# Patient Record
Sex: MALE | Race: WHITE | NOT HISPANIC OR LATINO | Employment: FULL TIME | URBAN - METROPOLITAN AREA
[De-identification: names, ages, dates, MRNs, and addresses within clinical notes are randomized per-mention and may not be internally consistent; named-entity substitution may affect disease eponyms.]

---

## 2022-01-06 ENCOUNTER — OFFICE VISIT (OUTPATIENT)
Dept: URGENT CARE | Facility: CLINIC | Age: 22
End: 2022-01-06
Payer: COMMERCIAL

## 2022-01-06 VITALS
TEMPERATURE: 99.1 F | HEIGHT: 76 IN | BODY MASS INDEX: 35.31 KG/M2 | RESPIRATION RATE: 18 BRPM | HEART RATE: 90 BPM | WEIGHT: 290 LBS | DIASTOLIC BLOOD PRESSURE: 80 MMHG | SYSTOLIC BLOOD PRESSURE: 130 MMHG | OXYGEN SATURATION: 96 %

## 2022-01-06 DIAGNOSIS — L02.215 PERINEAL ABSCESS: ICD-10-CM

## 2022-01-06 PROCEDURE — 99204 OFFICE O/P NEW MOD 45 MIN: CPT | Performed by: PHYSICIAN ASSISTANT

## 2022-01-06 RX ORDER — SULFAMETHOXAZOLE AND TRIMETHOPRIM 800; 160 MG/1; MG/1
1 TABLET ORAL 2 TIMES DAILY
Qty: 20 TABLET | Refills: 0 | Status: SHIPPED | OUTPATIENT
Start: 2022-01-06 | End: 2022-01-16

## 2022-01-06 ASSESSMENT — ENCOUNTER SYMPTOMS
FLANK PAIN: 0
BLOOD IN STOOL: 0
CHILLS: 0
BACK PAIN: 0
VOMITING: 0
FEVER: 0
MYALGIAS: 0
ROS SKIN COMMENTS: DRAINING ABSCESS
NAUSEA: 0
ABDOMINAL PAIN: 0

## 2022-01-06 NOTE — PATIENT INSTRUCTIONS
Skin Abscess    A skin abscess is an infected area on or under your skin that contains a collection of pus and other material. An abscess may also be called a furuncle, carbuncle, or boil. An abscess can occur in or on almost any part of your body.  Some abscesses break open (rupture) on their own. Most continue to get worse unless they are treated. The infection can spread deeper into the body and eventually into your blood, which can make you feel ill. Treatment usually involves draining the abscess.  What are the causes?  An abscess occurs when germs, like bacteria, pass through your skin and cause an infection. This may be caused by:  · A scrape or cut on your skin.  · A puncture wound through your skin, including a needle injection or insect bite.  · Blocked oil or sweat glands.  · Blocked and infected hair follicles.  · A cyst that forms beneath your skin (sebaceous cyst) and becomes infected.  What increases the risk?  This condition is more likely to develop in people who:  · Have a weak body defense system (immune system).  · Have diabetes.  · Have dry and irritated skin.  · Get frequent injections or use illegal IV drugs.  · Have a foreign body in a wound, such as a splinter.  · Have problems with their lymph system or veins.  What are the signs or symptoms?  Symptoms of this condition include:  · A painful, firm bump under the skin.  · A bump with pus at the top. This may break through the skin and drain.  Other symptoms include:  · Redness surrounding the abscess site.  · Warmth.  · Swelling of the lymph nodes (glands) near the abscess.  · Tenderness.  · A sore on the skin.  How is this diagnosed?  This condition may be diagnosed based on:  · A physical exam.  · Your medical history.  · A sample of pus. This may be used to find out what is causing the infection.  · Blood tests.  · Imaging tests, such as an ultrasound, CT scan, or MRI.  How is this treated?  A small abscess that drains on its own may not  need treatment. Treatment for larger abscesses may include:  · Moist heat or heat pack applied to the area several times a day.  · A procedure to drain the abscess (incision and drainage).  · Antibiotic medicines. For a severe abscess, you may first get antibiotics through an IV and then change to antibiotics by mouth.  Follow these instructions at home:  Medicines    · Take over-the-counter and prescription medicines only as told by your health care provider.  · If you were prescribed an antibiotic medicine, take it as told by your health care provider. Do not stop taking the antibiotic even if you start to feel better.  Abscess care    · If you have an abscess that has not drained, apply heat to the affected area. Use the heat source that your health care provider recommends, such as a moist heat pack or a heating pad.  ? Place a towel between your skin and the heat source.  ? Leave the heat on for 20-30 minutes.  ? Remove the heat if your skin turns bright red. This is especially important if you are unable to feel pain, heat, or cold. You may have a greater risk of getting burned.  · Follow instructions from your health care provider about how to take care of your abscess. Make sure you:  ? Cover the abscess with a bandage (dressing).  ? Change your dressing or gauze as told by your health care provider.  ? Wash your hands with soap and water before you change the dressing or gauze. If soap and water are not available, use hand .  · Check your abscess every day for signs of a worsening infection. Check for:  ? More redness, swelling, or pain.  ? More fluid or blood.  ? Warmth.  ? More pus or a bad smell.  General instructions  · To avoid spreading the infection:  ? Do not share personal care items, towels, or hot tubs with others.  ? Avoid making skin contact with other people.  · Keep all follow-up visits as told by your health care provider. This is important.  Contact a health care provider if you  have:  · More redness, swelling, or pain around your abscess.  · More fluid or blood coming from your abscess.  · Warm skin around your abscess.  · More pus or a bad smell coming from your abscess.  · A fever.  · Muscle aches.  · Chills or a general ill feeling.  Get help right away if you:  · Have severe pain.  · See red streaks on your skin spreading away from the abscess.  Summary  · A skin abscess is an infected area on or under your skin that contains a collection of pus and other material.  · A small abscess that drains on its own may not need treatment.  · Treatment for larger abscesses may include having a procedure to drain the abscess and taking an antibiotic.  This information is not intended to replace advice given to you by your health care provider. Make sure you discuss any questions you have with your health care provider.  Document Released: 09/27/2006 Document Revised: 04/09/2020 Document Reviewed: 01/31/2019  [employer] Patient Education © 2020 [employer]

## 2022-01-06 NOTE — PROGRESS NOTES
"Subjective:   Chris Smith is a 21 y.o. male who presents for Rectal Pain (x 2 days ago, rectal bleeding pain, frecuent urination)      HPI  21 y.o. male presents to urgent care with new problem to provider of perineal abscess with associated worsening pain for the last few days with spontaneous bloody discharge and some improvement of pain noted yesterday.  Patient denies history of similar.  He denies fevers or vomiting.  No rectal bleeding.  Denies dysuria or hematuria.  Denies history of MRSA.  Denies other associated aggravating or alleviating factors.     Review of Systems   Constitutional: Negative for chills and fever.   Gastrointestinal: Negative for abdominal pain, blood in stool, melena, nausea and vomiting.   Genitourinary: Positive for frequency. Negative for dysuria, flank pain, hematuria and urgency.   Musculoskeletal: Negative for back pain and myalgias.   Skin:        Draining abscess       Patient Active Problem List   Diagnosis   • Seasonal allergies   • Obesity   • Viral URI   • Snoring     Past Surgical History:   Procedure Laterality Date   • ARCH BAR APPLICATION  2/25/2012    Performed by JOSE LUIS WEIR at SURGERY [facility]     Social History     Tobacco Use   • Smoking status: Current Every Day Smoker   • Smokeless tobacco: Never Used   Vaping Use   • Vaping Use: Every day   • Substances: Nicotine   • Devices: Refillable tank   Substance Use Topics   • Alcohol use: No   • Drug use: No      Family History   Problem Relation Age of Onset   • Cancer Father         lymphoma      (Allergies, Medications, & Tobacco/Substance Use were reconciled by the Medical Assistant and reviewed by myself. The family history is prepopulated)     Objective:     /80   Pulse 90   Temp 37.3 °C (99.1 °F)   Resp 18   Ht 1.93 m (6' 4\")   Wt (!) 132 kg (290 lb)   SpO2 96%   BMI 35.30 kg/m²     Physical Exam  Vitals reviewed. Exam conducted with a chaperone present.   Constitutional:       Appearance: " Normal appearance.   HENT:      Head: Normocephalic and atraumatic.   Eyes:      Conjunctiva/sclera: Conjunctivae normal.   Cardiovascular:      Rate and Rhythm: Normal rate.   Pulmonary:      Effort: Pulmonary effort is normal. No respiratory distress.   Abdominal:      Palpations: Abdomen is soft.      Tenderness: There is no abdominal tenderness.   Genitourinary:      Musculoskeletal:      Cervical back: Normal range of motion.   Neurological:      Mental Status: He is alert and oriented to person, place, and time.   Psychiatric:         Mood and Affect: Mood normal.         Behavior: Behavior normal.         Thought Content: Thought content normal.         Judgment: Judgment normal.         Assessment/Plan:     1. Perineal abscess  sulfamethoxazole-trimethoprim (BACTRIM DS) 800-160 MG tablet     Patient started on 10 day course of Bactrim. Recommend sitz baths and ibuprofen for symptomatic relief.   No indication for I&D at this time. Continue to monitor for worsening symptoms including worsening pain, increasing size of abscess, fevers.     Differential diagnosis, natural history, supportive care, and indications for immediate follow-up discussed.    Advised the patient to follow-up with the primary care physician for recheck, reevaluation, and consideration of further management.  Patient verbalized understanding of treatment plan and has no further questions regarding care.     I personally reviewed prior external notes and test results pertinent to today's visit.     Please note that this dictation was created using voice recognition software. I have made a reasonable attempt to correct obvious errors, but I expect that there are errors of grammar and possibly content that I did not discover before finalizing the note.    This note was electronically signed by Shania Ramirez PA-C

## 2023-01-18 ENCOUNTER — OFFICE VISIT (OUTPATIENT)
Dept: MEDICAL GROUP | Facility: MEDICAL CENTER | Age: 23
End: 2023-01-18
Payer: COMMERCIAL

## 2023-01-18 VITALS
WEIGHT: 262.35 LBS | SYSTOLIC BLOOD PRESSURE: 110 MMHG | HEIGHT: 76 IN | BODY MASS INDEX: 31.95 KG/M2 | DIASTOLIC BLOOD PRESSURE: 66 MMHG | HEART RATE: 88 BPM | RESPIRATION RATE: 16 BRPM | OXYGEN SATURATION: 96 % | TEMPERATURE: 98.8 F

## 2023-01-18 DIAGNOSIS — Z76.89 ENCOUNTER TO ESTABLISH CARE WITH NEW DOCTOR: ICD-10-CM

## 2023-01-18 DIAGNOSIS — Z00.00 PREVENTATIVE HEALTH CARE: ICD-10-CM

## 2023-01-18 DIAGNOSIS — Z11.59 NEED FOR HEPATITIS C SCREENING TEST: ICD-10-CM

## 2023-01-18 DIAGNOSIS — Z11.4 SCREENING FOR HIV (HUMAN IMMUNODEFICIENCY VIRUS): ICD-10-CM

## 2023-01-18 DIAGNOSIS — J30.2 SEASONAL ALLERGIES: ICD-10-CM

## 2023-01-18 DIAGNOSIS — Z88.7 HISTORY OF INFLUENZA VACCINE ALLERGY: ICD-10-CM

## 2023-01-18 PROCEDURE — 99214 OFFICE O/P EST MOD 30 MIN: CPT | Performed by: STUDENT IN AN ORGANIZED HEALTH CARE EDUCATION/TRAINING PROGRAM

## 2023-01-18 ASSESSMENT — PATIENT HEALTH QUESTIONNAIRE - PHQ9: CLINICAL INTERPRETATION OF PHQ2 SCORE: 0

## 2023-01-18 NOTE — PROGRESS NOTES
Subjective:     CC:  Diagnoses of History of influenza vaccine allergy, Seasonal allergies, BMI 31.0-31.9,adult, Preventative health care, Screening for HIV (human immunodeficiency virus), Need for hepatitis C screening test, and Encounter to establish care with new doctor were pertinent to this visit.    HISTORY OF THE PRESENT ILLNESS: Patient is a 22 y.o. male. This pleasant patient is here today to establish care and discuss chronic conditions. His prior PCP was none.    Problem   Preventative Health Care    Patient is here to establish care today. He will be starting a new job working in [employer] at [employer]. He is requesting medical exemption from flu vaccine. He reports previously got severe dehydration after getting flu vaccine in 2018.     History of Influenza Vaccine Allergy    He reports previously got severe dehydration after getting flu vaccine in 2018.     Bmi 31.0-31.9,Adult    Chronic condition. He eats fair diet in general. He does not regularly exercise.     Seasonal Allergies    Chronic condition.  Current regimen: Claritin as needed  No acute concerns.         No current Epic-ordered outpatient medications on file.     No current Epic-ordered facility-administered medications on file.     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  Alcohol/tobacco/illicit drugs: vaping daily, rare EtOH, denies illicit drugs    Health Maintenance: reviewed  Vaccines: due for flu (declined), Tdap, PCV, HPV, COVID-19    ROS:   Gen: no fevers/chills, no changes in weight  Eyes: no changes in vision  Pulm: no sob, no cough  CV: no chest pain, no palpitations  GI: no nausea/vomiting, no diarrhea  : no dysuria  MSk: no myalgias  Skin: no rash  Neuro: no headaches, no numbness/tingling      Objective:     Exam: /66 (BP Location: Left arm, Patient Position: Sitting, BP Cuff Size: Adult)   Pulse 88   Temp 37.1 °C (98.8 °F)  "(Temporal)   Resp 16   Ht 1.93 m (6' 4\")   Wt 119 kg (262 lb 5.6 oz)   SpO2 96%  Body mass index is 31.93 kg/m².    General: Normal appearing. No distress.  HEENT: Normocephalic. Mallampati class 4.  Neck: Supple without JVD or bruit. Thyroid is not enlarged.  Pulmonary: Clear to ausculation. Normal effort. No rales, ronchi, or wheezing.  Cardiovascular: Regular rate and rhythm without murmur. Carotid and radial pulses are intact and equal bilaterally.  Abdomen: Soft, nontender, nondistended. Normal bowel sounds.  Neurologic: Grossly nonfocal  Skin: Warm and dry.  No obvious lesions.  Musculoskeletal: Normal gait. No extremity cyanosis, clubbing, or edema.  Psych: Normal mood and affect. Alert and oriented x3. Judgment and insight is normal.    Labs: No recent lab results available for review at this time    Assessment & Plan:   22 y.o. male with the following -    1. History of influenza vaccine allergy  Reported history of severe dehydration after receiving flu vaccine in 2018. Patient acknowledged understanding of the benefits vs risks of influenza vaccine and prefers to avoid flu vaccine in the future.  - medical exemption form completed and returned to patient    2. Seasonal allergies  Chronic condition, stable.  - cont current regimen: Claritin as needed    3. BMI 31.0-31.9,adult  - Comp Metabolic Panel; Future  - HEMOGLOBIN A1C; Future  - Lipid Profile; Future  - TSH WITH REFLEX TO FT4; Future  - Patient identified as having weight management issue.  Appropriate orders and counseling given.    4. Preventative health care  - Comp Metabolic Panel; Future  - HEMOGLOBIN A1C; Future  - HEP C VIRUS ANTIBODY; Future  - Lipid Profile; Future  - TSH WITH REFLEX TO FT4; Future  - HIV AG/AB COMBO ASSAY SCREENING; Future    5. Screening for HIV (human immunodeficiency virus)  - HIV AG/AB COMBO ASSAY SCREENING; Future    6. Need for hepatitis C screening test  - HEP C VIRUS ANTIBODY; Future    7. Encounter to " establish care with new doctor        Return in about 2 months (around 3/18/2023) for annual physical.    Please note that this dictation was created using voice recognition software. I have made every reasonable attempt to correct obvious errors, but I expect that there are errors of grammar and possibly content that I did not discover before finalizing the note.

## 2023-09-08 ENCOUNTER — DOCUMENTATION (OUTPATIENT)
Dept: HEALTH INFORMATION MANAGEMENT | Facility: OTHER | Age: 23
End: 2023-09-08
Payer: COMMERCIAL

## 2023-10-01 ENCOUNTER — HOSPITAL ENCOUNTER (EMERGENCY)
Facility: MEDICAL CENTER | Age: 23
End: 2023-10-01
Attending: EMERGENCY MEDICINE
Payer: COMMERCIAL

## 2023-10-01 VITALS
HEART RATE: 80 BPM | OXYGEN SATURATION: 98 % | SYSTOLIC BLOOD PRESSURE: 135 MMHG | DIASTOLIC BLOOD PRESSURE: 84 MMHG | HEIGHT: 76 IN | RESPIRATION RATE: 16 BRPM | WEIGHT: 278.88 LBS | BODY MASS INDEX: 33.96 KG/M2 | TEMPERATURE: 98.1 F

## 2023-10-01 DIAGNOSIS — H16.9 KERATITIS, RIGHT: ICD-10-CM
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
## 2023-10-01 DIAGNOSIS — H16.9 KERATITIS, LEFT: ICD-10-CM

## 2023-10-01 PROCEDURE — 700101 HCHG RX REV CODE 250: Performed by: EMERGENCY MEDICINE

## 2023-10-01 PROCEDURE — A9270 NON-COVERED ITEM OR SERVICE: HCPCS | Performed by: EMERGENCY MEDICINE

## 2023-10-01 PROCEDURE — 99283 EMERGENCY DEPT VISIT LOW MDM: CPT

## 2023-10-01 PROCEDURE — 700102 HCHG RX REV CODE 250 W/ 637 OVERRIDE(OP): Performed by: EMERGENCY MEDICINE

## 2023-10-01 RX ORDER — ERYTHROMYCIN 5 MG/G
1 OINTMENT OPHTHALMIC 4 TIMES DAILY
Qty: 3.5 G | Refills: 0 | Status: ACTIVE | OUTPATIENT
Start: 2023-10-01

## 2023-10-01 RX ORDER — PROPARACAINE HYDROCHLORIDE 5 MG/ML
1 SOLUTION/ DROPS OPHTHALMIC ONCE
Status: COMPLETED | OUTPATIENT
Start: 2023-10-01 | End: 2023-10-01

## 2023-10-01 RX ORDER — HYDROCODONE BITARTRATE AND ACETAMINOPHEN 5; 325 MG/1; MG/1
1 TABLET ORAL EVERY 4 HOURS PRN
Qty: 20 TABLET | Refills: 0 | Status: SHIPPED | OUTPATIENT
Start: 2023-10-01 | End: 2023-10-06

## 2023-10-01 RX ORDER — HYDROCODONE BITARTRATE AND ACETAMINOPHEN 5; 325 MG/1; MG/1
1 TABLET ORAL ONCE
Status: COMPLETED | OUTPATIENT
Start: 2023-10-01 | End: 2023-10-01

## 2023-10-01 RX ADMIN — HYDROCODONE BITARTRATE AND ACETAMINOPHEN 1 TABLET: 5; 325 TABLET ORAL at 07:13

## 2023-10-01 RX ADMIN — PROPARACAINE HYDROCHLORIDE 1 DROP: 5 SOLUTION/ DROPS OPHTHALMIC at 07:15

## 2023-10-01 RX ADMIN — FLUORESCEIN SODIUM 1 MG: 1 STRIP OPHTHALMIC at 07:00

## 2023-10-01 ASSESSMENT — PAIN DESCRIPTION - PAIN TYPE: TYPE: ACUTE PAIN

## 2023-10-01 NOTE — ED TRIAGE NOTES
"Chief Complaint   Patient presents with    Eye Pain     Pt reports yesterday around 5pm, pt was doing welding and feels like he saw a raw light, and after that he feels like his eyes are burning and really hurt when he  opens his eyes.      BP (!) 134/94   Pulse 91   Temp 36.8 °C (98.2 °F) (Temporal)   Resp 18   Ht 1.93 m (6' 4\")   Wt (!) 126 kg (278 lb 14.1 oz)   SpO2 98%   BMI 33.95 kg/m²     "

## 2023-10-01 NOTE — ED PROVIDER NOTES
ER Provider Note    Scribed for Truman Polk D.O. by Dione Brooks. 10/1/2023  6:46 AM    Primary Care Provider: Dakotah Chavez D.O.    CHIEF COMPLAINT  Chief Complaint   Patient presents with    Eye Pain     Pt reports yesterday around 5pm, pt was doing welding and feels like he saw a raw light, and after that he feels like his eyes are burning and really hurt when he  opens his eyes.        HPI/ROS  Chris Smith is a 23 y.o. male who presents to the Emergency Department for eye pain onset 13 hours ago. Patient states he is a  and was wearing a welding helmet. He notes he usually waits for the helmet to deploy before starting welding but did not wait this time. He describes feeling like he saw a raw light and feels like his eyes are burning. He states he began welding without any protection and woke up this morning unable to see. His father attempted to rinse out his eyes with water which gave the patient no alleviation. Patient is unable to open his eyes without pain. He has associating eye swelling to both eyes. Patient denies eye drainage. He admits to having had allergies in the past but denies today's sensation feeling like allergies. No alleviating or exacerbating factors noted.      ROS as per HPI.    PAST MEDICAL HISTORY  Past Medical History:   Diagnosis Date    Chicken pox     as a baby    Chicken pox     contracted chicken pox in 06/2002    FH: migraines     FHx: allergies     FHx: blood disorder     Blood clots    FHx: breast cancer     FHx: colon cancer     Other acute infections of external ear     as a baby     SURGICAL HISTORY  Past Surgical History:   Procedure Laterality Date    ARCH BAR APPLICATION  2/25/2012    Performed by JOSE LUIS WEIR at SURGERY [facility]S     FAMILY HISTORY  Family History   Problem Relation Age of Onset    Cancer Father         lymphoma     SOCIAL HISTORY   reports that he has quit smoking. His smoking use included cigarettes. He uses smokeless tobacco. He  "reports that he does not drink alcohol and does not use drugs.    CURRENT MEDICATIONS  No current outpatient medications    ALLERGIES  Nkda [no known drug allergy]    PHYSICAL EXAM  BP (!) 134/94   Pulse 91   Temp 36.8 °C (98.2 °F) (Temporal)   Resp 18   Ht 1.93 m (6' 4\")   Wt (!) 126 kg (278 lb 14.1 oz)   SpO2 98%   BMI 33.95 kg/m²     General: No acute distress.  HENT: Normocephalic, Mucus membranes are moist.   Abdomen: Non distended.  Neuro: Awake, Conversive, Able to relay recent events.  Eyes: Bilateral periorbital swelling. There is bilateral conjunctivitis. .Extraocular motions intact.   Psychiatric: Calm and cooperative.     INITIAL ASSESSMENT  Patient presents with bilateral eye pain and swelling. Started approximately 12 hours watching a welding procedure. After alkane drop installation he was able to open his eyes denies. Patient denies visual changes.    ED Observation Status? No; Patient does not meet criteria for ED Observation.     COURSE & MEDICAL DECISION MAKING     COURSE AND PLAN  6:46 AM - Patient was seen and evaluated at bedside. Patient presents to the ED for eye pain onset 13 hours ago. Thorough eye exam performed by me at this time. On exam patient presents with bilateral conjunctivitis and extraocular motions intact's. Discussed with the patient his symptoms are due to the light from welding and may feel like a sunburn to his eyes. Instructed him to wear dark sunglasses to alleviate his symptoms and this feeling should resolve. After my exam, I discussed with the patient the plan of care which includes treating with outpatient medications. Patient understands and verbalizes agreement to plan of care. Ordered fluorescein strip 1 mg to evaluate.     ED Summary: Patient did not have advised on doing some welding yesterday.  He woke up with melanite with severe pain.  He has bilateral conjunctivitis and keratitis on examination of both eyes.  He does not notice any change in his " vision.    He will be treated with pain medication, antibiotic ointment and to call ophthalmology if this is not significantly improved or resolved by tomorrow.    Slit Lamp Procedure    Indication: Evaluation of eyes        Procedure: The patient's head was positioned appropriately to provide adequate exposure of both eyes. Fluorescein staining was performed in both eyes which revealed no eye abrasions or increased uptake. Anterior chambers are deep and clear. Diffuse eye uptake consistent with corneal keratitis.     The patient tolerated the procedure well.    Complications: None    DISPOSITION AND DISCUSSIONS  Patient will be discharged home.    FOLLOW UP:  Tho Yang M.D.  [address]  [place] [address]  [phone]        OUTPATIENT MEDICATIONS:  Discharge Medication List as of 10/1/2023  7:17 AM        START taking these medications    Details   HYDROcodone-acetaminophen (NORCO) 5-325 MG Tab per tablet Take 1 Tablet by mouth every four hours as needed (Pain) for up to 5 days., Disp-20 Tablet, R-0, Normal      erythromycin 5 MG/GM Ointment Apply 1 Application to both eyes 4 times a day., Disp-3.5 g, R-0, Normal           FINAL DIAGNOSIS  1. Exposure to welding light (arc), initial encounter    2. Keratitis, left    3. Keratitis, right      IDione (Scribe), am scribing for, and in the presence of, Truman Polk D.O..    Electronically signed by: Dione Brooks (Scribe), 10/1/2023    ITruman D.O. personally performed the services described in this documentation, as scribed by Dione Brooks in my presence, and it is both accurate and complete.     The note accurately reflects work and decisions made by me.  Truman Polk D.O.  10/1/2023  12:38 PM

## 2023-10-01 NOTE — ED NOTES
Pt discharged to father, reviewed all discharge instructions including medications and follow up,discussed no driving or drinking alcohol on narcotics and the risks of taking narcotics, discussed electronic prescription, pt verbalizes understanding, and denies questions.  Escorted to lobby.

## 2024-04-03 ENCOUNTER — TELEPHONE (OUTPATIENT)
Dept: HEALTH INFORMATION MANAGEMENT | Facility: OTHER | Age: 24
End: 2024-04-03
Payer: COMMERCIAL

## 2024-06-30 PROBLEM — S82.141A TIBIAL PLATEAU FRACTURE, RIGHT, CLOSED, INITIAL ENCOUNTER: Status: ACTIVE | Noted: 2024-06-30

## 2024-06-30 PROBLEM — S42.022A CLOSED FRACTURE OF SHAFT OF LEFT CLAVICLE: Status: ACTIVE | Noted: 2024-06-30

## 2024-06-30 PROBLEM — T07.XXXA ABRASIONS OF MULTIPLE SITES: Status: ACTIVE | Noted: 2024-06-30

## 2024-06-30 PROBLEM — S22.32XA CLOSED FRACTURE OF ONE RIB OF LEFT SIDE: Status: ACTIVE | Noted: 2024-06-30

## 2024-06-30 PROBLEM — S92.912A FRACTURE OF PHALANX OF TOE OF LEFT FOOT: Status: ACTIVE | Noted: 2024-06-30

## 2024-06-30 PROBLEM — M25.572 ACUTE BILATERAL ANKLE PAIN: Status: ACTIVE | Noted: 2024-06-30

## 2024-06-30 PROBLEM — T14.90XA TRAUMA: Status: ACTIVE | Noted: 2024-06-30

## 2024-06-30 PROBLEM — S62.92XA: Status: ACTIVE | Noted: 2024-06-30

## 2024-06-30 PROBLEM — S82.892A CLOSED FRACTURE OF LEFT ANKLE: Status: ACTIVE | Noted: 2024-06-30

## 2024-06-30 PROBLEM — S51.012A ELBOW LACERATION, LEFT, INITIAL ENCOUNTER: Status: ACTIVE | Noted: 2024-06-30

## 2024-06-30 PROBLEM — M25.571 ACUTE BILATERAL ANKLE PAIN: Status: ACTIVE | Noted: 2024-06-30

## 2024-07-01 PROBLEM — Z75.8 DISCHARGE PLANNING ISSUES: Status: ACTIVE | Noted: 2024-07-01

## 2024-07-02 PROBLEM — E87.1 HYPONATREMIA: Status: ACTIVE | Noted: 2024-07-02

## 2024-07-02 PROBLEM — S82.891A CLOSED FRACTURE OF RIGHT ANKLE: Status: ACTIVE | Noted: 2024-06-30

## 2024-07-03 PROBLEM — S82.141A CLOSED FRACTURE OF RIGHT TIBIAL PLATEAU, INITIAL ENCOUNTER: Status: ACTIVE | Noted: 2024-07-03

## 2024-07-30 ENCOUNTER — APPOINTMENT (OUTPATIENT)
Dept: PHYSICAL THERAPY | Facility: MEDICAL CENTER | Age: 24
End: 2024-07-30
Attending: PHYSICAL MEDICINE & REHABILITATION
Payer: COMMERCIAL

## 2024-08-02 ENCOUNTER — APPOINTMENT (OUTPATIENT)
Dept: PHYSICAL THERAPY | Facility: MEDICAL CENTER | Age: 24
End: 2024-08-02
Attending: PHYSICAL MEDICINE & REHABILITATION
Payer: COMMERCIAL

## 2024-08-05 ENCOUNTER — TELEPHONE (OUTPATIENT)
Dept: HEALTH INFORMATION MANAGEMENT | Facility: OTHER | Age: 24
End: 2024-08-05
Payer: COMMERCIAL

## 2024-08-15 ENCOUNTER — APPOINTMENT (OUTPATIENT)
Dept: PHYSICAL THERAPY | Facility: MEDICAL CENTER | Age: 24
End: 2024-08-15
Attending: PHYSICAL MEDICINE & REHABILITATION
Payer: COMMERCIAL

## 2024-08-22 ENCOUNTER — APPOINTMENT (OUTPATIENT)
Dept: PHYSICAL THERAPY | Facility: MEDICAL CENTER | Age: 24
End: 2024-08-22
Attending: PHYSICAL MEDICINE & REHABILITATION
Payer: COMMERCIAL

## 2024-08-30 ENCOUNTER — APPOINTMENT (OUTPATIENT)
Dept: PHYSICAL THERAPY | Facility: MEDICAL CENTER | Age: 24
End: 2024-08-30
Attending: PHYSICAL MEDICINE & REHABILITATION
Payer: COMMERCIAL

## 2024-09-05 ENCOUNTER — APPOINTMENT (OUTPATIENT)
Dept: PHYSICAL THERAPY | Facility: MEDICAL CENTER | Age: 24
End: 2024-09-05
Attending: PHYSICAL MEDICINE & REHABILITATION
Payer: COMMERCIAL

## 2024-09-10 ENCOUNTER — APPOINTMENT (OUTPATIENT)
Dept: PHYSICAL THERAPY | Facility: MEDICAL CENTER | Age: 24
End: 2024-09-10
Attending: PHYSICAL MEDICINE & REHABILITATION
Payer: COMMERCIAL

## 2024-09-13 ENCOUNTER — APPOINTMENT (OUTPATIENT)
Dept: PHYSICAL THERAPY | Facility: MEDICAL CENTER | Age: 24
End: 2024-09-13
Attending: PHYSICAL MEDICINE & REHABILITATION
Payer: COMMERCIAL

## 2024-09-24 ENCOUNTER — APPOINTMENT (OUTPATIENT)
Dept: PHYSICAL THERAPY | Facility: MEDICAL CENTER | Age: 24
End: 2024-09-24
Attending: PHYSICAL MEDICINE & REHABILITATION
Payer: COMMERCIAL

## 2024-09-27 ENCOUNTER — APPOINTMENT (OUTPATIENT)
Dept: PHYSICAL THERAPY | Facility: MEDICAL CENTER | Age: 24
End: 2024-09-27
Attending: PHYSICAL MEDICINE & REHABILITATION
Payer: COMMERCIAL

## 2024-09-30 ENCOUNTER — APPOINTMENT (OUTPATIENT)
Dept: PHYSICAL THERAPY | Facility: MEDICAL CENTER | Age: 24
End: 2024-09-30
Attending: PHYSICAL MEDICINE & REHABILITATION
Payer: COMMERCIAL